# Patient Record
Sex: FEMALE | Race: WHITE | Employment: OTHER | URBAN - METROPOLITAN AREA
[De-identification: names, ages, dates, MRNs, and addresses within clinical notes are randomized per-mention and may not be internally consistent; named-entity substitution may affect disease eponyms.]

---

## 2023-09-13 ENCOUNTER — HOSPITAL ENCOUNTER (OUTPATIENT)
Dept: OCCUPATIONAL THERAPY | Age: 78
Setting detail: THERAPIES SERIES
Discharge: HOME | End: 2023-09-13
Payer: MEDICARE

## 2023-09-13 PROCEDURE — 97165 OT EVAL LOW COMPLEX 30 MIN: CPT

## 2023-09-13 PROCEDURE — 97537 COMMUNITY/WORK REINTEGRATION: CPT

## 2024-11-14 ENCOUNTER — HOSPITAL ENCOUNTER (INPATIENT)
Age: 79
LOS: 2 days | Discharge: HOME | End: 2024-11-16
Attending: EMERGENCY MEDICINE | Admitting: INTERNAL MEDICINE
Payer: MEDICARE

## 2024-11-14 ENCOUNTER — APPOINTMENT (OUTPATIENT)
Dept: CT IMAGING | Age: 79
End: 2024-11-14
Payer: MEDICARE

## 2024-11-14 ENCOUNTER — APPOINTMENT (OUTPATIENT)
Dept: GENERAL RADIOLOGY | Age: 79
End: 2024-11-14
Payer: MEDICARE

## 2024-11-14 DIAGNOSIS — R55 SYNCOPE AND COLLAPSE: Primary | ICD-10-CM

## 2024-11-14 DIAGNOSIS — R55 SYNCOPE, UNSPECIFIED SYNCOPE TYPE: ICD-10-CM

## 2024-11-14 LAB
ANION GAP SERPL CALCULATED.3IONS-SCNC: 16 MMOL/L (ref 3–16)
BACTERIA URNS QL MICRO: ABNORMAL /HPF
BASOPHILS # BLD: 0 K/UL (ref 0–0.2)
BASOPHILS NFR BLD: 0.6 %
BILIRUB UR QL STRIP.AUTO: NEGATIVE
BUN SERPL-MCNC: 25 MG/DL (ref 7–20)
CALCIUM SERPL-MCNC: 9.2 MG/DL (ref 8.3–10.6)
CHLORIDE SERPL-SCNC: 99 MMOL/L (ref 99–110)
CLARITY UR: CLEAR
CO2 SERPL-SCNC: 20 MMOL/L (ref 21–32)
COLOR UR: YELLOW
CREAT SERPL-MCNC: 1.1 MG/DL (ref 0.6–1.2)
DEPRECATED RDW RBC AUTO: 13.1 % (ref 12.4–15.4)
EOSINOPHIL # BLD: 0.1 K/UL (ref 0–0.6)
EOSINOPHIL NFR BLD: 1.5 %
EPI CELLS #/AREA URNS HPF: ABNORMAL /HPF (ref 0–5)
GFR SERPLBLD CREATININE-BSD FMLA CKD-EPI: 50 ML/MIN/{1.73_M2}
GLUCOSE SERPL-MCNC: 114 MG/DL (ref 70–99)
GLUCOSE UR STRIP.AUTO-MCNC: NEGATIVE MG/DL
HCT VFR BLD AUTO: 40 % (ref 36–48)
HGB BLD-MCNC: 13.4 G/DL (ref 12–16)
HGB UR QL STRIP.AUTO: NEGATIVE
KETONES UR STRIP.AUTO-MCNC: NEGATIVE MG/DL
LACTATE BLDV-SCNC: 3.4 MMOL/L (ref 0.4–2)
LEUKOCYTE ESTERASE UR QL STRIP.AUTO: NEGATIVE
LYMPHOCYTES # BLD: 3.2 K/UL (ref 1–5.1)
LYMPHOCYTES NFR BLD: 42.6 %
MAGNESIUM SERPL-MCNC: 1.71 MG/DL (ref 1.8–2.4)
MCH RBC QN AUTO: 30.4 PG (ref 26–34)
MCHC RBC AUTO-ENTMCNC: 33.4 G/DL (ref 31–36)
MCV RBC AUTO: 91.2 FL (ref 80–100)
MONOCYTES # BLD: 0.6 K/UL (ref 0–1.3)
MONOCYTES NFR BLD: 8 %
NEUTROPHILS # BLD: 3.5 K/UL (ref 1.7–7.7)
NEUTROPHILS NFR BLD: 47.3 %
NITRITE UR QL STRIP.AUTO: NEGATIVE
NT-PROBNP SERPL-MCNC: 138 PG/ML (ref 0–449)
PH BLDV: 7.39 [PH] (ref 7.35–7.45)
PH UR STRIP.AUTO: 6 [PH] (ref 5–8)
PLATELET # BLD AUTO: 204 K/UL (ref 135–450)
PMV BLD AUTO: 8.4 FL (ref 5–10.5)
POTASSIUM SERPL-SCNC: 3.5 MMOL/L (ref 3.5–5.1)
PROT UR STRIP.AUTO-MCNC: NEGATIVE MG/DL
RBC # BLD AUTO: 4.39 M/UL (ref 4–5.2)
RBC #/AREA URNS HPF: ABNORMAL /HPF (ref 0–4)
SODIUM SERPL-SCNC: 135 MMOL/L (ref 136–145)
SP GR UR STRIP.AUTO: 1.01 (ref 1–1.03)
TROPONIN, HIGH SENSITIVITY: 10 NG/L (ref 0–14)
TROPONIN, HIGH SENSITIVITY: 9 NG/L (ref 0–14)
UA DIPSTICK W REFLEX MICRO PNL UR: ABNORMAL
URN SPEC COLLECT METH UR: ABNORMAL
UROBILINOGEN UR STRIP-ACNC: 0.2 E.U./DL
WBC # BLD AUTO: 7.4 K/UL (ref 4–11)
WBC #/AREA URNS HPF: ABNORMAL /HPF (ref 0–5)

## 2024-11-14 PROCEDURE — 85025 COMPLETE CBC W/AUTO DIFF WBC: CPT

## 2024-11-14 PROCEDURE — 83880 ASSAY OF NATRIURETIC PEPTIDE: CPT

## 2024-11-14 PROCEDURE — 80048 BASIC METABOLIC PNL TOTAL CA: CPT

## 2024-11-14 PROCEDURE — 70450 CT HEAD/BRAIN W/O DYE: CPT

## 2024-11-14 PROCEDURE — P9612 CATHETERIZE FOR URINE SPEC: HCPCS

## 2024-11-14 PROCEDURE — 99285 EMERGENCY DEPT VISIT HI MDM: CPT

## 2024-11-14 PROCEDURE — 93005 ELECTROCARDIOGRAM TRACING: CPT | Performed by: EMERGENCY MEDICINE

## 2024-11-14 PROCEDURE — 84484 ASSAY OF TROPONIN QUANT: CPT

## 2024-11-14 PROCEDURE — 81001 URINALYSIS AUTO W/SCOPE: CPT

## 2024-11-14 PROCEDURE — 83735 ASSAY OF MAGNESIUM: CPT

## 2024-11-14 PROCEDURE — 36415 COLL VENOUS BLD VENIPUNCTURE: CPT

## 2024-11-14 PROCEDURE — 71045 X-RAY EXAM CHEST 1 VIEW: CPT

## 2024-11-14 PROCEDURE — 1200000000 HC SEMI PRIVATE

## 2024-11-14 PROCEDURE — 83605 ASSAY OF LACTIC ACID: CPT

## 2024-11-14 RX ORDER — ACETAMINOPHEN 650 MG/1
650 SUPPOSITORY RECTAL EVERY 6 HOURS PRN
Status: DISCONTINUED | OUTPATIENT
Start: 2024-11-14 | End: 2024-11-16 | Stop reason: HOSPADM

## 2024-11-14 RX ORDER — CHOLESTYRAMINE LIGHT 4 G/5.7G
4 POWDER, FOR SUSPENSION ORAL PRN
Status: ON HOLD | COMMUNITY
End: 2024-11-16 | Stop reason: HOSPADM

## 2024-11-14 RX ORDER — SODIUM CHLORIDE 9 MG/ML
INJECTION, SOLUTION INTRAVENOUS PRN
Status: DISCONTINUED | OUTPATIENT
Start: 2024-11-14 | End: 2024-11-16 | Stop reason: HOSPADM

## 2024-11-14 RX ORDER — POTASSIUM CHLORIDE 1500 MG/1
40 TABLET, EXTENDED RELEASE ORAL PRN
Status: DISCONTINUED | OUTPATIENT
Start: 2024-11-14 | End: 2024-11-16 | Stop reason: HOSPADM

## 2024-11-14 RX ORDER — SODIUM CHLORIDE 9 MG/ML
INJECTION, SOLUTION INTRAVENOUS ONCE
Status: COMPLETED | OUTPATIENT
Start: 2024-11-15 | End: 2024-11-15

## 2024-11-14 RX ORDER — MAGNESIUM SULFATE IN WATER 40 MG/ML
2000 INJECTION, SOLUTION INTRAVENOUS PRN
Status: DISCONTINUED | OUTPATIENT
Start: 2024-11-14 | End: 2024-11-16 | Stop reason: HOSPADM

## 2024-11-14 RX ORDER — APIXABAN 5 MG/1
5 TABLET, FILM COATED ORAL 2 TIMES DAILY
COMMUNITY
Start: 2024-09-13

## 2024-11-14 RX ORDER — ACETAMINOPHEN 325 MG/1
650 TABLET ORAL EVERY 6 HOURS PRN
Status: DISCONTINUED | OUTPATIENT
Start: 2024-11-14 | End: 2024-11-16 | Stop reason: HOSPADM

## 2024-11-14 RX ORDER — ONDANSETRON 2 MG/ML
4 INJECTION INTRAMUSCULAR; INTRAVENOUS EVERY 6 HOURS PRN
Status: DISCONTINUED | OUTPATIENT
Start: 2024-11-14 | End: 2024-11-16 | Stop reason: HOSPADM

## 2024-11-14 RX ORDER — POLYETHYLENE GLYCOL 3350 17 G/17G
17 POWDER, FOR SOLUTION ORAL DAILY PRN
Status: DISCONTINUED | OUTPATIENT
Start: 2024-11-14 | End: 2024-11-16 | Stop reason: HOSPADM

## 2024-11-14 RX ORDER — POTASSIUM CHLORIDE 7.45 MG/ML
10 INJECTION INTRAVENOUS PRN
Status: DISCONTINUED | OUTPATIENT
Start: 2024-11-14 | End: 2024-11-16 | Stop reason: HOSPADM

## 2024-11-14 RX ORDER — ONDANSETRON 4 MG/1
4 TABLET, ORALLY DISINTEGRATING ORAL EVERY 8 HOURS PRN
Status: DISCONTINUED | OUTPATIENT
Start: 2024-11-14 | End: 2024-11-16 | Stop reason: HOSPADM

## 2024-11-14 RX ORDER — AMLODIPINE BESYLATE 5 MG/1
1 TABLET ORAL DAILY
COMMUNITY
Start: 2024-11-12

## 2024-11-14 RX ORDER — SODIUM CHLORIDE 0.9 % (FLUSH) 0.9 %
5-40 SYRINGE (ML) INJECTION PRN
Status: DISCONTINUED | OUTPATIENT
Start: 2024-11-14 | End: 2024-11-16 | Stop reason: HOSPADM

## 2024-11-14 RX ORDER — SODIUM CHLORIDE 0.9 % (FLUSH) 0.9 %
5-40 SYRINGE (ML) INJECTION EVERY 12 HOURS SCHEDULED
Status: DISCONTINUED | OUTPATIENT
Start: 2024-11-14 | End: 2024-11-16 | Stop reason: HOSPADM

## 2024-11-14 RX ORDER — PAROXETINE 40 MG/1
40 TABLET, FILM COATED ORAL EVERY MORNING
Status: DISCONTINUED | OUTPATIENT
Start: 2024-11-15 | End: 2024-11-16 | Stop reason: HOSPADM

## 2024-11-14 ASSESSMENT — PAIN - FUNCTIONAL ASSESSMENT: PAIN_FUNCTIONAL_ASSESSMENT: NONE - DENIES PAIN

## 2024-11-14 ASSESSMENT — LIFESTYLE VARIABLES
HOW OFTEN DO YOU HAVE A DRINK CONTAINING ALCOHOL: 2-4 TIMES A MONTH
HOW MANY STANDARD DRINKS CONTAINING ALCOHOL DO YOU HAVE ON A TYPICAL DAY: 1 OR 2

## 2024-11-15 ENCOUNTER — APPOINTMENT (OUTPATIENT)
Age: 79
End: 2024-11-15
Attending: STUDENT IN AN ORGANIZED HEALTH CARE EDUCATION/TRAINING PROGRAM
Payer: MEDICARE

## 2024-11-15 ENCOUNTER — APPOINTMENT (OUTPATIENT)
Dept: MRI IMAGING | Age: 79
End: 2024-11-15
Payer: MEDICARE

## 2024-11-15 ENCOUNTER — APPOINTMENT (OUTPATIENT)
Dept: CT IMAGING | Age: 79
End: 2024-11-15
Payer: MEDICARE

## 2024-11-15 PROBLEM — I44.7 NEW ONSET LEFT BUNDLE BRANCH BLOCK (LBBB): Status: ACTIVE | Noted: 2024-11-15

## 2024-11-15 PROBLEM — R41.89 COGNITIVE DECLINE: Status: ACTIVE | Noted: 2024-11-15

## 2024-11-15 PROBLEM — G45.3 AMAUROSIS FUGAX OF LEFT EYE: Status: ACTIVE | Noted: 2024-11-15

## 2024-11-15 PROBLEM — G40.209 COMPLEX PARTIAL SEIZURES (HCC): Status: ACTIVE | Noted: 2024-11-14

## 2024-11-15 LAB
ANION GAP SERPL CALCULATED.3IONS-SCNC: 9 MMOL/L (ref 3–16)
BUN SERPL-MCNC: 20 MG/DL (ref 7–20)
CALCIUM SERPL-MCNC: 8.2 MG/DL (ref 8.3–10.6)
CHLORIDE SERPL-SCNC: 104 MMOL/L (ref 99–110)
CO2 SERPL-SCNC: 23 MMOL/L (ref 21–32)
CREAT SERPL-MCNC: 0.8 MG/DL (ref 0.6–1.2)
ECHO BSA: 2.13 M2
ECHO LA AREA 2C: 20.1 CM2
ECHO LA AREA 4C: 15.1 CM2
ECHO LA MAJOR AXIS: 4.8 CM
ECHO LA MINOR AXIS: 5.4 CM
ECHO LA VOL BP: 51 ML (ref 22–52)
ECHO LA VOL MOD A2C: 61 ML (ref 22–52)
ECHO LA VOL MOD A4C: 39 ML (ref 22–52)
ECHO LA VOL/BSA BIPLANE: 25 ML/M2 (ref 16–34)
ECHO LA VOLUME INDEX MOD A2C: 29 ML/M2 (ref 16–34)
ECHO LA VOLUME INDEX MOD A4C: 19 ML/M2 (ref 16–34)
ECHO LV E' LATERAL VELOCITY: 10.2 CM/S
ECHO LV E' SEPTAL VELOCITY: 4.57 CM/S
ECHO LV EDV A2C: 148 ML
ECHO LV EDV A4C: 134 ML
ECHO LV EDV INDEX A4C: 65 ML/M2
ECHO LV EDV NDEX A2C: 71 ML/M2
ECHO LV EJECTION FRACTION A2C: 55 %
ECHO LV EJECTION FRACTION A4C: 57 %
ECHO LV EJECTION FRACTION BIPLANE: 56 % (ref 55–100)
ECHO LV ESV A2C: 67 ML
ECHO LV ESV A4C: 58 ML
ECHO LV ESV INDEX A2C: 32 ML/M2
ECHO LV ESV INDEX A4C: 28 ML/M2
ECHO LV FRACTIONAL SHORTENING: 27 % (ref 28–44)
ECHO LV INTERNAL DIMENSION DIASTOLE INDEX: 2.32 CM/M2
ECHO LV INTERNAL DIMENSION DIASTOLIC: 4.8 CM (ref 3.9–5.3)
ECHO LV INTERNAL DIMENSION SYSTOLIC INDEX: 1.69 CM/M2
ECHO LV INTERNAL DIMENSION SYSTOLIC: 3.5 CM
ECHO LV IVSD: 1.2 CM (ref 0.6–0.9)
ECHO LV MASS 2D: 245.7 G (ref 67–162)
ECHO LV MASS INDEX 2D: 118.7 G/M2 (ref 43–95)
ECHO LV POSTERIOR WALL DIASTOLIC: 1.4 CM (ref 0.6–0.9)
ECHO LV RELATIVE WALL THICKNESS RATIO: 0.58
ECHO RV FREE WALL PEAK S': 14.3 CM/S
ECHO RV TAPSE: 3.2 CM (ref 1.7–?)
EKG ATRIAL RATE: 66 BPM
EKG DIAGNOSIS: NORMAL
EKG P AXIS: 75 DEGREES
EKG P-R INTERVAL: 166 MS
EKG Q-T INTERVAL: 492 MS
EKG QRS DURATION: 152 MS
EKG QTC CALCULATION (BAZETT): 515 MS
EKG R AXIS: 25 DEGREES
EKG T AXIS: 87 DEGREES
EKG VENTRICULAR RATE: 66 BPM
GFR SERPLBLD CREATININE-BSD FMLA CKD-EPI: 74 ML/MIN/{1.73_M2}
GLUCOSE SERPL-MCNC: 109 MG/DL (ref 70–99)
POTASSIUM SERPL-SCNC: 3.8 MMOL/L (ref 3.5–5.1)
SODIUM SERPL-SCNC: 136 MMOL/L (ref 136–145)

## 2024-11-15 PROCEDURE — 97535 SELF CARE MNGMENT TRAINING: CPT

## 2024-11-15 PROCEDURE — 6360000004 HC RX CONTRAST MEDICATION: Performed by: PSYCHIATRY & NEUROLOGY

## 2024-11-15 PROCEDURE — 95819 EEG AWAKE AND ASLEEP: CPT

## 2024-11-15 PROCEDURE — 97530 THERAPEUTIC ACTIVITIES: CPT

## 2024-11-15 PROCEDURE — 97165 OT EVAL LOW COMPLEX 30 MIN: CPT

## 2024-11-15 PROCEDURE — 93321 DOPPLER ECHO F-UP/LMTD STD: CPT | Performed by: INTERNAL MEDICINE

## 2024-11-15 PROCEDURE — 1200000000 HC SEMI PRIVATE

## 2024-11-15 PROCEDURE — 75574 CT ANGIO HRT W/3D IMAGE: CPT

## 2024-11-15 PROCEDURE — A9576 INJ PROHANCE MULTIPACK: HCPCS | Performed by: PSYCHIATRY & NEUROLOGY

## 2024-11-15 PROCEDURE — 6370000000 HC RX 637 (ALT 250 FOR IP): Performed by: PSYCHIATRY & NEUROLOGY

## 2024-11-15 PROCEDURE — 99223 1ST HOSP IP/OBS HIGH 75: CPT | Performed by: PSYCHIATRY & NEUROLOGY

## 2024-11-15 PROCEDURE — 6360000004 HC RX CONTRAST MEDICATION: Performed by: INTERNAL MEDICINE

## 2024-11-15 PROCEDURE — 36415 COLL VENOUS BLD VENIPUNCTURE: CPT

## 2024-11-15 PROCEDURE — 6370000000 HC RX 637 (ALT 250 FOR IP): Performed by: INTERNAL MEDICINE

## 2024-11-15 PROCEDURE — 70553 MRI BRAIN STEM W/O & W/DYE: CPT

## 2024-11-15 PROCEDURE — 97116 GAIT TRAINING THERAPY: CPT

## 2024-11-15 PROCEDURE — 93308 TTE F-UP OR LMTD: CPT | Performed by: INTERNAL MEDICINE

## 2024-11-15 PROCEDURE — 6370000000 HC RX 637 (ALT 250 FOR IP): Performed by: STUDENT IN AN ORGANIZED HEALTH CARE EDUCATION/TRAINING PROGRAM

## 2024-11-15 PROCEDURE — 93325 DOPPLER ECHO COLOR FLOW MAPG: CPT | Performed by: INTERNAL MEDICINE

## 2024-11-15 PROCEDURE — 80048 BASIC METABOLIC PNL TOTAL CA: CPT

## 2024-11-15 PROCEDURE — 2580000003 HC RX 258: Performed by: INTERNAL MEDICINE

## 2024-11-15 PROCEDURE — 93308 TTE F-UP OR LMTD: CPT

## 2024-11-15 PROCEDURE — 97162 PT EVAL MOD COMPLEX 30 MIN: CPT

## 2024-11-15 RX ORDER — ATORVASTATIN CALCIUM 10 MG/1
10 TABLET, FILM COATED ORAL NIGHTLY
Status: DISCONTINUED | OUTPATIENT
Start: 2024-11-15 | End: 2024-11-16

## 2024-11-15 RX ORDER — SODIUM CHLORIDE 9 MG/ML
INJECTION, SOLUTION INTRAVENOUS PRN
Status: DISCONTINUED | OUTPATIENT
Start: 2024-11-15 | End: 2024-11-16 | Stop reason: HOSPADM

## 2024-11-15 RX ORDER — METOPROLOL TARTRATE 100 MG/1
100 TABLET ORAL
Status: COMPLETED | OUTPATIENT
Start: 2024-11-15 | End: 2024-11-15

## 2024-11-15 RX ORDER — LOSARTAN POTASSIUM 50 MG/1
100 TABLET ORAL DAILY
Status: DISCONTINUED | OUTPATIENT
Start: 2024-11-15 | End: 2024-11-16 | Stop reason: HOSPADM

## 2024-11-15 RX ORDER — SODIUM CHLORIDE 0.9 % (FLUSH) 0.9 %
5-40 SYRINGE (ML) INJECTION PRN
Status: DISCONTINUED | OUTPATIENT
Start: 2024-11-15 | End: 2024-11-16 | Stop reason: HOSPADM

## 2024-11-15 RX ORDER — TRIAMTERENE AND HYDROCHLOROTHIAZIDE 37.5; 25 MG/1; MG/1
1 TABLET ORAL DAILY
Status: DISCONTINUED | OUTPATIENT
Start: 2024-11-15 | End: 2024-11-16 | Stop reason: HOSPADM

## 2024-11-15 RX ORDER — IOPAMIDOL 755 MG/ML
100 INJECTION, SOLUTION INTRAVASCULAR
Status: COMPLETED | OUTPATIENT
Start: 2024-11-15 | End: 2024-11-15

## 2024-11-15 RX ORDER — LEVETIRACETAM 500 MG/1
500 TABLET ORAL 2 TIMES DAILY
Status: DISCONTINUED | OUTPATIENT
Start: 2024-11-15 | End: 2024-11-16 | Stop reason: HOSPADM

## 2024-11-15 RX ORDER — METOPROLOL TARTRATE 50 MG
50 TABLET ORAL
Status: COMPLETED | OUTPATIENT
Start: 2024-11-15 | End: 2024-11-15

## 2024-11-15 RX ORDER — SODIUM CHLORIDE 0.9 % (FLUSH) 0.9 %
5-40 SYRINGE (ML) INJECTION EVERY 12 HOURS SCHEDULED
Status: DISCONTINUED | OUTPATIENT
Start: 2024-11-15 | End: 2024-11-16 | Stop reason: HOSPADM

## 2024-11-15 RX ORDER — METOPROLOL TARTRATE 1 MG/ML
5 INJECTION, SOLUTION INTRAVENOUS EVERY 5 MIN PRN
Status: DISCONTINUED | OUTPATIENT
Start: 2024-11-15 | End: 2024-11-16 | Stop reason: HOSPADM

## 2024-11-15 RX ORDER — NITROGLYCERIN 0.4 MG/1
0.8 TABLET SUBLINGUAL
Status: COMPLETED | OUTPATIENT
Start: 2024-11-15 | End: 2024-11-15

## 2024-11-15 RX ORDER — NITROGLYCERIN 0.4 MG/1
0.4 TABLET SUBLINGUAL
Status: COMPLETED | OUTPATIENT
Start: 2024-11-15 | End: 2024-11-15

## 2024-11-15 RX ORDER — LORAZEPAM 0.5 MG/1
0.5 TABLET ORAL ONCE
Status: COMPLETED | OUTPATIENT
Start: 2024-11-15 | End: 2024-11-15

## 2024-11-15 RX ADMIN — Medication 3 MG: at 01:37

## 2024-11-15 RX ADMIN — APIXABAN 5 MG: 5 TABLET, FILM COATED ORAL at 20:16

## 2024-11-15 RX ADMIN — SODIUM CHLORIDE: 9 INJECTION, SOLUTION INTRAVENOUS at 01:47

## 2024-11-15 RX ADMIN — ATORVASTATIN CALCIUM 10 MG: 10 TABLET, FILM COATED ORAL at 12:02

## 2024-11-15 RX ADMIN — SODIUM CHLORIDE, PRESERVATIVE FREE 10 ML: 5 INJECTION INTRAVENOUS at 01:38

## 2024-11-15 RX ADMIN — LEVETIRACETAM 500 MG: 500 TABLET, FILM COATED ORAL at 13:20

## 2024-11-15 RX ADMIN — BUPROPION HYDROCHLORIDE 40 MG: 150 TABLET, FILM COATED, EXTENDED RELEASE ORAL at 08:41

## 2024-11-15 RX ADMIN — SODIUM CHLORIDE, PRESERVATIVE FREE 10 ML: 5 INJECTION INTRAVENOUS at 08:41

## 2024-11-15 RX ADMIN — NITROGLYCERIN 0.8 MG: 0.4 TABLET SUBLINGUAL at 13:47

## 2024-11-15 RX ADMIN — SODIUM CHLORIDE, PRESERVATIVE FREE 10 ML: 5 INJECTION INTRAVENOUS at 20:18

## 2024-11-15 RX ADMIN — LEVETIRACETAM 500 MG: 500 TABLET, FILM COATED ORAL at 20:17

## 2024-11-15 RX ADMIN — IOPAMIDOL 100 ML: 755 INJECTION, SOLUTION INTRAVENOUS at 13:47

## 2024-11-15 RX ADMIN — LORAZEPAM 0.5 MG: 0.5 TABLET ORAL at 16:10

## 2024-11-15 RX ADMIN — GADOTERIDOL 20 ML: 279.3 INJECTION, SOLUTION INTRAVENOUS at 16:55

## 2024-11-15 RX ADMIN — APIXABAN 5 MG: 5 TABLET, FILM COATED ORAL at 08:41

## 2024-11-15 RX ADMIN — LOSARTAN POTASSIUM 100 MG: 50 TABLET, FILM COATED ORAL at 12:01

## 2024-11-15 RX ADMIN — Medication 3 MG: at 20:17

## 2024-11-15 RX ADMIN — TRIAMTERENE AND HYDROCHLOROTHIAZIDE 1 TABLET: 37.5; 25 TABLET ORAL at 12:02

## 2024-11-15 RX ADMIN — APIXABAN 5 MG: 5 TABLET, FILM COATED ORAL at 01:37

## 2024-11-15 RX ADMIN — METOPROLOL TARTRATE 50 MG: 50 TABLET, FILM COATED ORAL at 11:31

## 2024-11-15 NOTE — ED NOTES
Patient ambulated to and from bathroom with steady gait   Urine sent to lab from prior RN.   Patient on continuous cardiac and SpO2 monitoring, equal rise in fall in chest, no signs of distress noted. No requests from patient at the moment. Bed in lowest position and call light within reach.        Jhonatan Rangel, RN  11/14/24 [id number]

## 2024-11-15 NOTE — PROGRESS NOTES
Patient declined admission questions due to request to sleep and answer during normal hours when awake in the daytime.

## 2024-11-15 NOTE — PLAN OF CARE
Problem: Discharge Planning  Goal: Discharge to home or other facility with appropriate resources  Outcome: Progressing  Flowsheets (Taken 11/15/2024 1519)  Discharge to home or other facility with appropriate resources:   Identify barriers to discharge with patient and caregiver   Arrange for needed discharge resources and transportation as appropriate   Identify discharge learning needs (meds, wound care, etc)     Problem: Safety - Adult  Goal: Free from fall injury  11/15/2024 1519 by Angi Perla RN  Outcome: Progressing  Flowsheets (Taken 11/15/2024 1519)  Free From Fall Injury: Instruct family/caregiver on patient safety

## 2024-11-15 NOTE — ED PROVIDER NOTES
THE [facility]  EMERGENCY DEPARTMENT ENCOUNTER          ATTENDING PHYSICIAN NOTE       Date of evaluation: 11/14/2024    ADDENDUM:      Care of this patient was assumed from Dr Sommer.  The patient was seen for Loss of Consciousness (Pt presents to the ED after a syncopal episode at home. Pt states that she does not remember what she was doing before she woke up on the floor. Pt stated that she thinks she may have fell. Pt is denying having any pain. Pt is alert and oriented x3. (disoriented to time) )  .  The patient's initial evaluation and plan have been discussed with the prior provider who initially evaluated the patient.  Nursing Notes, Past Medical Hx, Past Surgical Hx, Social Hx, Allergies, and Family Hx were all reviewed.    ASSESSMENT / PLAN  (MEDICAL DECISION MAKING)     Betty J Closser is a 79 y.o. female who presents with a chief complaint of loss of consciousness.  Patient reportedly had loss of consciousness at home, unclear if syncopal episode.  Seems confused, does not remember the event.  I was asked to follow-up on labs and then ultimately admit the patient for syncope workup.  Labs unremarkable with negative troponins, slightly elevated lactate, slightly low magnesium.  Patient well-appearing on several reassessments.  Urinalysis negative for infection.  Updated on plan of care to admit for syncope workup.  Stable for floor.    Is this patient to be included in the SEP-1 core measure? No Exclusion criteria - the patient is NOT to be included for SEP-1 Core Measure due to: Infection is not suspected    Medical Decision Making  Problems Addressed:  Syncope and collapse: acute illness or injury    Amount and/or Complexity of Data Reviewed  Labs: ordered. Decision-making details documented in ED Course.  Radiology: ordered. Decision-making details documented in ED Course.  ECG/medicine tests: ordered and independent interpretation performed. Decision-making details documented in ED Course.         Chloride 99 99 - 110 mmol/L    CO2 20 (L) 21 - 32 mmol/L    Anion Gap 16 3 - 16    Glucose 114 (H) 70 - 99 mg/dL    BUN 25 (H) 7 - 20 mg/dL    Creatinine 1.1 0.6 - 1.2 mg/dL    Est, Glom Filt Rate 50 (A) >60    Calcium 9.2 8.3 - 10.6 mg/dL   Brain Natriuretic Peptide   Result Value Ref Range    NT Pro- 0 - 449 pg/mL   Blood gas, venous (Lab)   Result Value Ref Range    pH, Paul 7.391 7.350 - 7.450   Lactic Acid   Result Value Ref Range    Lactic Acid 3.4 (H) 0.4 - 2.0 mmol/L   Urinalysis with Microscopic   Result Value Ref Range    Color, UA Yellow Straw/Yellow    Clarity, UA Clear Clear    Glucose, Ur Negative Negative mg/dL    Bilirubin, Urine Negative Negative    Ketones, Urine Negative Negative mg/dL    Specific Gravity, UA 1.015 1.005 - 1.030    Blood, Urine Negative Negative    pH, Urine 6.0 5.0 - 8.0    Protein, UA Negative Negative mg/dL    Urobilinogen, Urine 0.2 <2.0 E.U./dL    Nitrite, Urine Negative Negative    Leukocyte Esterase, Urine Negative Negative    Microscopic Examination Not Indicated     Urine Type NotGiven     WBC, UA 0-2 0 - 5 /HPF    RBC, UA 0-2 0 - 4 /HPF    Epithelial Cells, UA 0-1 0 - 5 /HPF    Bacteria, UA Rare (A) None Seen /HPF   Troponin   Result Value Ref Range    Troponin, High Sensitivity 9 0 - 14 ng/L   Troponin   Result Value Ref Range    Troponin, High Sensitivity 10 0 - 14 ng/L   Magnesium   Result Value Ref Range    Magnesium 1.71 (L) 1.80 - 2.40 mg/dL     EKG   See prior provider note for details    MOST RECENT VITALS:  BP: (!) 107/57, Temp: 98 °F (36.7 °C), Pulse: 63, Respirations: 17, SpO2: 97 %     Procedures     none    ED Course          The patient was given the following medications:  No orders of the defined types were placed in this encounter.      CONSULTS:  None       Marci Sagastume MD  11/14/24 0688

## 2024-11-15 NOTE — CONSULTS
N/V/D. No abdominal pain, appetite loss, blood in stools.  GENITOURINARY: No dysuria, trouble voiding, or hematuria.  MUSCULOSKELETAL:  No gait disturbance, weakness or joint complaints.  NEUROLOGICAL: No headache, diplopia, change in muscle strength, numbness or tingling. No change in gait, balance, coordination, mood, affect, memory, mentation, behavior.  PSHYCH: No anxiety, loss of interest, change in sexual behavior, feelings of self-harm, or confusion.  ENDOCRINE: No malaise, fatigue or temperature intolerance. No excessive thirst, fluid intake, or urination. No tremor.  HEMATOLOGIC: No abnormal bruising or bleeding.  ALLERGY: No nasal congestion or hives.      Physical Examination:     Vitals:    11/15/24 0300 11/15/24 0504 11/15/24 0544 11/15/24 0737   BP:   (!) 160/69 (!) 172/79   Pulse:  59 61 71   Resp:   16 16   Temp:   98.1 °F (36.7 °C) 97.4 °F (36.3 °C)   TempSrc:   Oral Oral   SpO2:   97% 93%   Weight: 96.5 kg (212 lb 11.9 oz)      Height:           Wt Readings from Last 3 Encounters:   11/15/24 96.5 kg (212 lb 11.9 oz)       Objective:  General Appearance:  Comfortable.    Vital signs: (most recent): Blood pressure (!) 172/79, pulse 71, temperature 97.4 °F (36.3 °C), temperature source Oral, resp. rate 16, height 1.702 m (5' 7\"), weight 96.5 kg (212 lb 11.9 oz), SpO2 93%, not currently breastfeeding.    Lungs:  Normal effort and normal respiratory rate.    Heart: Normal rate.  Regular rhythm.          General Appearance:  Alert, cooperative, no distress, appears stated age Appropriate weight   Head:  Normocephalic, without obvious abnormality, atraumatic   Eyes:  PERRL, conjunctiva/corneas clear EOM intact  Ears normal   Throat no lesions       Nose: Nares normal, no drainage or sinus tenderness   Throat: Lips, mucosa, and tongue normal   Neck: Supple, symmetrical, trachea midline, no adenopathy, thyroid: not enlarged, symmetric, no tenderness/mass/nodules, no carotid bruit or JVD       Lungs:   Clear  likely secondary to a syncopal event. Patient's last ambulatory monitor on 7/20/204 was unremarkable. Patient noted to have non obstructive CAD in last coronary CT in 2023; given presence of chest pain will repeat coronary CT. Given presence of new LBBB on EKG, will evaluate with CMR outpatient and 1 month monitor. Consider vascular consult given presence of carotid atherosclerosis and hx of amaurosis fugax  -f/u coronary CT  -CMR outpatient  -cardiac monitor for one month on discharge.    Thank you for allowing to us to participate in the care or Betty J Closser. Further evaluation will be based upon the patient's clinical course and testing results.    I have spent 40 minutes of face to face time with the patient with more than 50% spent counseling and coordinating care.       Boone Zaidi MD, PGY-1  11/15/24  10:03 AM

## 2024-11-15 NOTE — ED NOTES
Report given to receiving RN, no questions or concerns noted      Jhonatan Rangel, RN  11/14/24 0432

## 2024-11-15 NOTE — PROGRESS NOTES
Orthostatic BP check as follows:    0047 (lying):  /77, P 61  0051 (sitting):  /73, P 63  0056 (standing):  /79, P 64

## 2024-11-15 NOTE — PLAN OF CARE
Cardiac navigator informed me she could not delivered 2-week CAM as patient needed to have MRI done first.    Was later informed by nursing staff that monitor was left with nurse to place on patient at discharge.

## 2024-11-15 NOTE — PLAN OF CARE
[facility] [place]  Consult received.  Case reviewed with ER physician- admit for syncope    Full note to follow.    Lydia Byers, CAROL - CNP    Thanks  DAVID MARQUIS MD

## 2024-11-15 NOTE — ED NOTES
How does patient ambulate?   []Low Fall Risk (ambulates by themselves without support)  [x]Stand by assist   []Contact Guard   []Front wheel walker  []Wheelchair   [x]Steady  []Bed bound  []History of Lower Extremity Amputation  []Unknown, did not assess in the emergency department   How does patient take pills?  [x]Whole with Water  []Crushed in applesauce  []Crushed in pudding  []Other  []Unknown no oral medications were given in the ED  Is patient alert?   [x]Alert  []Drowsy but responds to voice  []Doesn't respond to voice but responds to painful stimuli  []Unresponsive  Is patient oriented?   [x]To person  [x]To place  [x]To time  []To situation  []Confused  []Agitated  []Follows commands  If patient is disoriented or from a Skill Nursing Facility has family been notified of admission?   []Yes   []No  Patient belongings?   []Cell phone  []Wallet   []Dentures  []Clothing  Any specific patient or family belongings/needs/dynamics?   Family at bedside  Miscellaneous comments/pending orders?  All ED orders complete    If there are any additional questions please reach out to the Emergency Department.           Rangel, Jhonatan, RN  11/14/24 0543

## 2024-11-15 NOTE — DISCHARGE INSTRUCTIONS
Patient instructions for CAM monitor:     You will need to wear monitor for 1  week.   Mail monitor back or return to office on following date.    Remove date:  11/22/2024      [facility]  [address]   [address]  [place], [place] [address]  [phone]         Make sure to save box as you will place monitor back in box to return to office.       After removing monitor stick it to template provided, place both your log and monitor in box          If monitor comes off but has been in place at least  5 days place in box and return to office..  If it comes off sooner than 5 days you will have to call office and return to have it replaced.        Avoid excess sweating to maximize wear time.         You are able to shower after 24 hours, however have majority of water hitting back and not directly on monitor. Do not submerge in bath.           If you experience any symptoms while wearing monitor push button and record in booklet.      For questions about monitor call: Customer Service [phone]         We are discharging you with some changes to your medications as well as some new prescriptions. We are starting you on a baby aspirin, 81mg once per day. A prescription for this has been sent to your pharmacy though it is also available over the counter. We are also discharging you with a new seizure medication called levetiracetam (Keppra), which will be 500mg twice per day. We have also increased your atorvastatin (Lipitor) to 40mg once per night, which is increased from the 10mg you were previously on.     Please resume the rest of your home medications as you have been prescribed them.    Please follow up with neurology as an outpatient.    Please follow up with your cardiologist for further management and results regarding you heart monitor.     Please follow up with your primary care physician within 1 week follow discharge    Please return to the hospital with any new or worsening symptoms.

## 2024-11-15 NOTE — PROGRESS NOTES
4 Eyes Admission Assessment     I agree as the admission nurse that 2 RN's have performed a thorough Head to Toe Skin Assessment on the patient. ALL assessment sites listed below have been assessed on admission.       Areas assessed by both nurses: Juana Le and Kun Min, RNs  [x]   Head, Face, and Ears   [x]   Shoulders, Back, and Chest  [x]   Arms, Elbows, and Hands   [x]   Coccyx, Sacrum, and Ischium  [x]   Legs, Feet, and Heels        Does the Patient have Skin Breakdown?  No         Zana Prevention initiated:  Yes   Wound Care Orders initiated:  NA      [facility] nurse consulted for Pressure Injury (Stage 3,4, Unstageable, DTI, NWPT, and Complex wounds) or Zana score 18 or lower:  NA      Nurse 1 eSignature: Electronically signed by Juana Le RN on 11/15/24 at 3:09 AM EST    **SHARE this note so that the co-signing nurse is able to place an eSignature**    Nurse 2 eSignature: Electronically signed by Angi Perla RN on 11/15/24 at 7:11 PM EST

## 2024-11-15 NOTE — PROGRESS NOTES
Occupational Therapy  Facility/Department: [address]  Occupational Therapy Initial Assessment and Treatment  Discharge    Name: Betty J Closser  : 1945  MRN: [id number]  Date of Service: 11/15/2024    Discharge Recommendations:  Home with assist PRN  OT Equipment Recommendations  Equipment Needed: No       Patient Diagnosis(es): The primary encounter diagnosis was Syncope and collapse. A diagnosis of Syncope, unspecified syncope type was also pertinent to this visit.           Assessment  Assessment: Presenting s/p syncopal episode - being worked up for possible seizures. Pt lives alone and typically independent w/ ADLs, IADLs, and ambulates w/o AD (does have housekeeping assist 1x/month and dtr checks on daily). Today, pt demonstrating abiliity to engage in ADLs and functional transfers/mobility safely w/ no LOB. Pt has no skilled OT needs. Did discuss recommendation for Alert System w/ fall detection feature to sound off emergency alert should pt have an episode - pt/family receptive to suggestion. Will sign off.  Decision Making: Low Complexity  REQUIRES OT FOLLOW-UP: No  Activity Tolerance  Activity Tolerance: Patient Tolerated treatment well     Plan  Occupational Therapy Plan  Times Per Week: Discharge acute OT - no further OT needs indicated.    Restrictions  Position Activity Restriction  Other position/activity restrictions: Up as tolerated    Subjective  General  Chart Reviewed: Yes  Additional Pertinent Hx: 79 y.o. F presents to the ED after a syncopal episode at home. Pt states that she does not remember what she was doing before she woke up on the floor.              Hospital Course: CXR: (-); CT Head: most likely represent chronic small vessel ischemic change.                  PMH: mild cognitive impairment, PAF, HTN, HLD, OA, HOLLEY on CPAP  Family / Caregiver Present: Yes (dtr and son-in-law)  Referring Practitioner: Mukund Braun MD  Diagnosis: Syncope and

## 2024-11-15 NOTE — CONSULTS
Neurology Consultation Note      Patient: Betty J Closser MRN: [id number]    YOB: 1945  Age: 79 y.o.  Sex: female   Unit: [address] Room/Bed: 6301/6301-01 Location: [facility]    Date of Consultation: 11/15/2024  Date of Admission: 11/14/2024  8:29 PM ( LOS: 1 day )  Admitting Physician: DAVID MARQUIS    Primary Care Physician: Lydia Byers, CAROL - CNP   Consult Requested By: David Marquis MD     Reason for Consult: \"syncope\"    ASSESSMENT & RECOMMENDATIONS     Assessment  78yo woman with mild cognitive impairment and afib on apixaban with apparent episode of amaurosis fugax in March of this year (~8 mo ago) presented to ED after an episode of transient alteration of awareness to which she is amnestic for several hours surrounding the event  It seems that her amnesia to the event is out of proportion to her degree of cognitive decline and thusly cannot be attributed to that  Amnesia to this degree is more suspicious for complex partial seizure  When the event which occurred in June, which was very similar to last night's event, is factored in, the likelihood of seizure becomes even greater  She is being evaluated for any potential cardiac causes of her event, but the overall clinical picture is not consistent with syncope and therefore unlikely there is any cardiac cause here at all  Her episode of apparent amaurosis is concerning, but unlikely relevant here  Workup for this included carotid ultrasound, which was unremarkable, but treatment is usually antiplatelet agent (which she is not on) and tight control of vascular risk factors (all of which is recommended)  Her apparent mild cognitive impairment (MCI), which was diagnosed a few years ago and may have progressed at this point, does not likely directly factor into her likely seizure diagnosis, although it could because there is an increased risk of seizures in dementia  Given the likelihood that these events  Sensitivity 9 0 - 14 ng/L   Magnesium    Collection Time: 11/14/24  9:26 PM   Result Value Ref Range    Magnesium 1.71 (L) 1.80 - 2.40 mg/dL   Blood gas, venous (Lab)    Collection Time: 11/14/24  9:36 PM   Result Value Ref Range    pH, Paul 7.391 7.350 - 7.450   Urinalysis with Microscopic    Collection Time: 11/14/24 10:15 PM   Result Value Ref Range    Color, UA Yellow Straw/Yellow    Clarity, UA Clear Clear    Glucose, Ur Negative Negative mg/dL    Bilirubin, Urine Negative Negative    Ketones, Urine Negative Negative mg/dL    Specific Gravity, UA 1.015 1.005 - 1.030    Blood, Urine Negative Negative    pH, Urine 6.0 5.0 - 8.0    Protein, UA Negative Negative mg/dL    Urobilinogen, Urine 0.2 <2.0 E.U./dL    Nitrite, Urine Negative Negative    Leukocyte Esterase, Urine Negative Negative    Microscopic Examination Not Indicated     Urine Type NotGiven     WBC, UA 0-2 0 - 5 /HPF    RBC, UA 0-2 0 - 4 /HPF    Epithelial Cells, UA 0-1 0 - 5 /HPF    Bacteria, UA Rare (A) None Seen /HPF   Troponin    Collection Time: 11/14/24 10:27 PM   Result Value Ref Range    Troponin, High Sensitivity 10 0 - 14 ng/L   Basic Metabolic Panel w/ Reflex to MG    Collection Time: 11/15/24  2:45 AM   Result Value Ref Range    Sodium 136 136 - 145 mmol/L    Potassium reflex Magnesium 3.8 3.5 - 5.1 mmol/L    Chloride 104 99 - 110 mmol/L    CO2 23 21 - 32 mmol/L    Anion Gap 9 3 - 16    Glucose 109 (H) 70 - 99 mg/dL    BUN 20 7 - 20 mg/dL    Creatinine 0.8 0.6 - 1.2 mg/dL    Est, Glom Filt Rate 74 >60    Calcium 8.2 (L) 8.3 - 10.6 mg/dL       Scheduled Meds:   sodium chloride flush  5-40 mL IntraVENous 2 times per day    apixaban  5 mg Oral BID    PARoxetine  40 mg Oral QAM    sodium chloride flush  5-40 mL IntraVENous 2 times per day    melatonin  3 mg Oral Nightly       Continuous Infusions:  sodium chloride  sodium chloride         PRN Meds:  perflutren lipid microspheres, 1.5 mL, ONCE PRN  metoprolol tartrate, 50 mg, Once PRN

## 2024-11-15 NOTE — ED PROVIDER NOTES
THE [facility]  EMERGENCY DEPARTMENT ENCOUNTER          ATTENDING PHYSICIAN NOTE       Date of evaluation: 11/14/2024    Chief Complaint     Loss of Consciousness (Pt presents to the ED after a syncopal episode at home. Pt states that she does not remember what she was doing before she woke up on the floor. Pt stated that she thinks she may have fell. Pt is denying having any pain. Pt is alert and oriented x3. (disoriented to time) )      History of Present Illness     Betty J Closser is a 79 y.o. female with a past medical history of mild cognitive impairment, paroxysmal atrial fibrillation for which she is anticoagulated on Eliquis, hypertension, and a number of other medical comorbidities.  She was apparently evaluated earlier this year for an episode of amaurosis fugax.  She lives independently in an apartment, and is generally functional at baseline.  She presents to the emergency department by EMS after a possible syncopal episode, with confusion and amnesia.  EMS reports that the patient potentially fell, although cannot say when.  On my initial interview with the patient, she describes being on the couch and then waking up on the floor not knowing how she got there.  Later in the interview, she does not recall saying this at all, and does not recall ever having been on the floor.  The patient is unable to give any further details of the events of this evening.  The patient's sister arrived shortly thereafter, and states that around dinnertime this evening, the patient called her and stated that she did not feel well and wanted her to come over and help, although had difficulty describing exactly what she needed help with.  At that time she did not describe any falls.  In the meantime, other family members were called, and at some point 911 was called by a family member.  When another family member arrived, EMS had also just arrived, and the patient was seated in a chair being evaluated.  EMS stated  feel poorly.  It is not clear whether she had a fall or a syncopal event, or whether she was simply very weak and needed to crawl to the door to let EMS in.  EMS was not able to give any history, and the patient herself does not recall any of the events of this evening.  Her sister describes that she called stating that she did not feel well and needed help, but was not able to describe any particular way in which she felt poorly or any particular help that she needed.  There has been some question of whether she may have fallen or passed out, but she has no evidence of any traumatic injury on exam.  She has a history earlier this year of having an episode of amaurosis fugax, but currently does not have any focal neurologic deficits to suggest a strokelike event.    A fairly broad workup has been initiated.  Head CT shows some age-indeterminate lacunar changes, but no acute abnormalities otherwise.  EKG shows a left bundle branch block, without other acute ischemic changes.  There are no prior EKG images in our system to compare to, as her prior care appears to been in the [facility] system.  However, I do not see any documentation of a left bundle branch block, and the most recent EKG performed in April of this year showed a normal QRS duration at 107.    At this time, the remainder of the patient's workup is pending, including troponin.  She is not having any active chest pain, and is not certain when a new left bundle branch block may have developed, but given this EKG finding, as well as the questionable syncopal event today, it is likely that she will warrant admission for further evaluation and management.    The patient's care is being given over the oncoming physician, who will follow-up on the results of the remainder of the patient's workup in the emergency department, and determine appropriate further management and disposition accordingly.      (Please note that portions of this note were completed

## 2024-11-15 NOTE — PROGRESS NOTES
D:  Please note, patient is newly admitted to [address] [place] [address] from the ED.  There are no diet orders on file and patient is asking for water.  Please advise.  Thank you.  A:  Notified provider, Shira Keith.  R:  Per provider above, \"Order is in for a Regular Diet.\"

## 2024-11-15 NOTE — PROGRESS NOTES
Admission: Patient received to room 6301 from ED. Patient admitted with Dx of Syncope and Collapse. Patient A&Ox4 upon arrival.  Tele monitor applied, rate and rhythm verified with monitor reader. Admission assessment as charted. VSS. Patient oriented to room, staff, and call system. Educated on fall protocol and hourly rounding. Patient informed to utilize call light with any needs. Pt verbalized understanding. Will continue to monitor.

## 2024-11-15 NOTE — H&P
V2.0  History and Physical      Name:  Betty J Closser /Age/Sex: 1945  (79 y.o. female)   MRN & CSN:  [id number] & [id number] Encounter Date/Time: 2024 11:39 PM EST   Location:   PCP: Lydia Byers APRN - CNP       Hospital Day: 1    Assessment and Plan:   Betty J Closser is a 79 y.o. female with PMH of PAF, HTN, HLD, OA, HOLLEY on CPAP who presents with Syncope and collapse    Syncope- patient passed out and woke up on floor    BP soft and labs shows mild hyponatremia and mild hypomagnesemia- she is on dyazide at home  ? Dehydration  EKG shows LBBB but nothing to compare with- h/o NICM which has since resolved per notes from [place]  Consult cardiology  Consult neurology  Telemetry  Check orthostatics  Hold BP meds for now  CTH/CXR NAD    2. Essential HTN- chronic    Hold home meds for now  /70 in ER    3. PAF- chronic    EKG shows nSR  Resume eliquis    4. Major depression- chronic    Resume paxil    5. HOLLEY- resume home CPAP  Hospital Problems             Last Modified POA    * (Principal) Syncope and collapse 2024 Yes       Disposition:   Current Living situation: home  Expected Disposition: home  Estimated D/C: 11/15/24    Diet No diet orders on file   DVT Prophylaxis [] Lovenox, []  Heparin, [] SCDs, [] Ambulation,  [x] Eliquis, [] Xarelto, [] Coumadin   Code Status Full Code   Surrogate Decision Maker/ POA      Personally reviewed Lab Studies and Imaging       History from:     patient    History of Present Illness:     Chief Complaint: passed out    Betty J Closser is a 79 y.o. female with a past medical history of mild cognitive impairment, paroxysmal atrial fibrillation for which she is anticoagulated on Eliquis, hypertension, HOLLEY, HLD.  She was apparently evaluated earlier this year for an episode of amaurosis fugax.  She lives independently in an apartment, and is generally functional at baseline.  She presents to the emergency department by EMS after a possible  Negative.    Psychiatric/Behavioral: Negative.       Pertinent positives and negatives discussed in HPI     Objective:   No intake or output data in the 24 hours ending 11/14/24 2339   Vitals:   Vitals:    11/14/24 2158 11/14/24 2208 11/14/24 2230 11/14/24 2300   BP: 127/75  133/66 (!) 107/57   Pulse: 62 67 65 63   Resp: 17 19 17 17   Temp:       TempSrc:       SpO2:  98% 100% 97%   Weight:       Height:           Medications Prior to Admission     Prior to Admission medications    Medication Sig Start Date End Date Taking? Authorizing Provider   ELIQUIS 5 MG TABS tablet Take 1 tablet by mouth 2 times daily 9/13/24  Yes Jaimie Galvez MD   amLODIPine (NORVASC) 5 MG tablet Take 1 tablet by mouth daily 11/12/24  Yes Jaimie Galvez MD   CALCIUM PO Take by mouth    Jaimie Galvez MD   cholestyramine light 4 g packet Take 1 packet by mouth as needed    Jaimie Galvez MD   atorvastatin (LIPITOR) 10 MG tablet Take 10 mg by mouth daily.      Jaimie Galvez MD   paroxetine (PAXIL) 20 MG tablet Take 2 tablets by mouth every morning    Jaimie Galvez MD   triamterene-hydrochlorothiazide (MAXZIDE-25) 37.5-25 MG per tablet Take 1 tablet by mouth daily.      Jaimie Galvez MD   sumatriptan (IMITREX) 50 MG tablet Take 50 mg by mouth once as needed.      Jaimie Galvez MD   losartan (COZAAR) 100 MG tablet Take 1 tablet by mouth daily    Jaimie Galvez MD       Physical Exam:    Physical Exam     General: NAD  Eyes: EOMI  ENT: neck supple  Cardiovascular: Regular rate.  Respiratory: Clear to auscultation  Gastrointestinal: Soft, non tender  Genitourinary: no suprapubic tenderness  Musculoskeletal: No edema  Skin: warm, dry  Neuro: Alert.  Psych: Mood appropriate.       Past Medical History:   PMHx   Past Medical History:   Diagnosis Date    Cancer (HCC) breast    Hyperlipidemia     Hypertension      PSHX:  has a past surgical history that includes Breast

## 2024-11-15 NOTE — PROGRESS NOTES
understanding      Therapy Time   Individual Concurrent Group Co-treatment   Time In 1111         Time Out 1150         Minutes 39         Timed Code Treatment Minutes:  25  Total Treatment Minutes:  39      Carolyn Waterman, PT

## 2024-11-15 NOTE — PLAN OF CARE
Problem: Safety - Adult  Goal: Free from fall injury  Outcome: Progressing  Note:  Remains free from falls, bed in low position, call light in reach, bed alarm monitoring for safety.     Problem: Pain  Goal: Verbalizes/displays adequate comfort level or baseline comfort level  Outcome: Progressing  Note:  Denies pain/needs, will continue to monitor.     Problem: Respiratory - Adult  Goal: Achieves optimal ventilation and oxygenation  Outcome: Progressing  Note:  O2 97% on Room Air.     Problem: Cardiovascular - Adult  Goal: Absence of cardiac dysrhythmias or at baseline  Outcome: Progressing  Note:  Sinus Bradycardia with PQT & IVCD rate 58 at 0202.

## 2024-11-16 VITALS
TEMPERATURE: 97.4 F | HEART RATE: 60 BPM | HEIGHT: 67 IN | BODY MASS INDEX: 33.36 KG/M2 | OXYGEN SATURATION: 98 % | DIASTOLIC BLOOD PRESSURE: 77 MMHG | SYSTOLIC BLOOD PRESSURE: 124 MMHG | WEIGHT: 212.52 LBS | RESPIRATION RATE: 16 BRPM

## 2024-11-16 LAB
ANION GAP SERPL CALCULATED.3IONS-SCNC: 7 MMOL/L (ref 3–16)
BUN SERPL-MCNC: 13 MG/DL (ref 7–20)
CALCIUM SERPL-MCNC: 8.5 MG/DL (ref 8.3–10.6)
CHLORIDE SERPL-SCNC: 103 MMOL/L (ref 99–110)
CO2 SERPL-SCNC: 25 MMOL/L (ref 21–32)
CREAT SERPL-MCNC: 0.8 MG/DL (ref 0.6–1.2)
GFR SERPLBLD CREATININE-BSD FMLA CKD-EPI: 74 ML/MIN/{1.73_M2}
GLUCOSE SERPL-MCNC: 96 MG/DL (ref 70–99)
POTASSIUM SERPL-SCNC: 4.1 MMOL/L (ref 3.5–5.1)
SODIUM SERPL-SCNC: 135 MMOL/L (ref 136–145)

## 2024-11-16 PROCEDURE — 99233 SBSQ HOSP IP/OBS HIGH 50: CPT | Performed by: PSYCHIATRY & NEUROLOGY

## 2024-11-16 PROCEDURE — 80048 BASIC METABOLIC PNL TOTAL CA: CPT

## 2024-11-16 PROCEDURE — 6370000000 HC RX 637 (ALT 250 FOR IP): Performed by: INTERNAL MEDICINE

## 2024-11-16 PROCEDURE — 6370000000 HC RX 637 (ALT 250 FOR IP): Performed by: STUDENT IN AN ORGANIZED HEALTH CARE EDUCATION/TRAINING PROGRAM

## 2024-11-16 PROCEDURE — 2580000003 HC RX 258: Performed by: INTERNAL MEDICINE

## 2024-11-16 PROCEDURE — 6370000000 HC RX 637 (ALT 250 FOR IP): Performed by: PSYCHIATRY & NEUROLOGY

## 2024-11-16 RX ORDER — LEVETIRACETAM 500 MG/1
500 TABLET ORAL 2 TIMES DAILY
Qty: 60 TABLET | Refills: 3 | Status: SHIPPED | OUTPATIENT
Start: 2024-11-16

## 2024-11-16 RX ORDER — ATORVASTATIN CALCIUM 40 MG/1
40 TABLET, FILM COATED ORAL NIGHTLY
Status: DISCONTINUED | OUTPATIENT
Start: 2024-11-16 | End: 2024-11-16 | Stop reason: HOSPADM

## 2024-11-16 RX ORDER — ASPIRIN 81 MG/1
81 TABLET, CHEWABLE ORAL DAILY
Status: DISCONTINUED | OUTPATIENT
Start: 2024-11-16 | End: 2024-11-16 | Stop reason: HOSPADM

## 2024-11-16 RX ORDER — ASPIRIN 81 MG/1
81 TABLET, CHEWABLE ORAL DAILY
Qty: 30 TABLET | Refills: 3 | Status: SHIPPED | OUTPATIENT
Start: 2024-11-16

## 2024-11-16 RX ORDER — ATORVASTATIN CALCIUM 40 MG/1
40 TABLET, FILM COATED ORAL NIGHTLY
Qty: 30 TABLET | Refills: 0 | Status: SHIPPED | OUTPATIENT
Start: 2024-11-16

## 2024-11-16 RX ADMIN — APIXABAN 5 MG: 5 TABLET, FILM COATED ORAL at 10:21

## 2024-11-16 RX ADMIN — LEVETIRACETAM 500 MG: 500 TABLET, FILM COATED ORAL at 10:21

## 2024-11-16 RX ADMIN — SODIUM CHLORIDE, PRESERVATIVE FREE 10 ML: 5 INJECTION INTRAVENOUS at 10:21

## 2024-11-16 RX ADMIN — BUPROPION HYDROCHLORIDE 40 MG: 150 TABLET, FILM COATED, EXTENDED RELEASE ORAL at 10:24

## 2024-11-16 RX ADMIN — LOSARTAN POTASSIUM 100 MG: 50 TABLET, FILM COATED ORAL at 10:21

## 2024-11-16 RX ADMIN — TRIAMTERENE AND HYDROCHLOROTHIAZIDE 1 TABLET: 37.5; 25 TABLET ORAL at 10:22

## 2024-11-16 NOTE — CARE COORDINATION
Case Management Assessment            Discharge Note                    Date / Time of Note: 11/16/2024 2:18 PM                  Discharge Note Completed by: Arabella Lenin    Patient Name: Betty J Closser   YOB: 1945  Diagnosis: Syncope and collapse [R55]   Date / Time: 11/14/2024  8:29 PM    Current PCP: Lydia Byers, CAROL - CNP  Clinic patient: No    Hospitalization in the last 30 days: No       Advance Directives:  Code Status: Full Code  [place] DNR form completed and on chart: Not Indicated    Financial:  Payor: HU[employer] MEDICARE / Plan: HU[employer] CHOICE-PPO MEDICARE / Product Type: *No Product type* /      Pharmacy:    [id number] - [place], [place] - [address] - P [phone] - F [phone]  [address] [facility] [address]  Phone: [phone] Fax: [phone]    [facility] - [facility] [address] [phone] -  [phone]  [address] [facility] [address]  Phone: [phone] Fax: [phone]      Assistance purchasing medications?:    Assistance provided by Case Management: None at this time    Does patient want to participate in local refill/ meds to beds program?: Yes    Meds To Beds General Rules:  1. Can ONLY be done Monday- Friday between 8:30am-5pm  2. Prescription(s) must be in pharmacy by 3pm to be filled same day  3.Copy of patient's insurance/ prescription drug card and patient face sheet must be sent along with the prescription(s)  4. Cost of Rx cannot be added to hospital bill. If financial assistance is needed, please contact unit  or ;  or  CANNOT provide pharmacy voucher for patients co-pays  5. Patients can then  the prescription on their way out of the hospital at discharge, or pharmacy can deliver to the bedside if staff is available. (payment due at time of pick-up or delivery - cash, check, or card accepted)     Able to afford

## 2024-11-16 NOTE — DISCHARGE SUMMARY
V2.0  Discharge Summary    Name:  Betty J Closser /Age/Sex: 1945 (79 y.o. female)   Admit Date: 2024  Discharge Date: 24    MRN & CSN:  [id number] & [id number] Encounter Date and Time 24 12:42 PM EST    Attending:  Trav Scott DO Discharging Provider: Trav Scott DO       Hospital Course:     Brief HPI: Betty J Closser is a 79 y.o. female who presented with an episode of \"passing out\"     Betty J Closser is a 79 y.o. female with a past medical history of mild cognitive impairment, paroxysmal atrial fibrillation for which she is anticoagulated on Eliquis, hypertension, HOLLEY, HLD.  She was apparently evaluated earlier this year for an episode of amaurosis fugax.  She lives independently in an apartment, and is generally functional at baseline.  She presents to the emergency department by EMS after a possible syncopal episode, with confusion and amnesia.  EMS reports that the patient potentially fell, although cannot say when.  On interview with the patient, she describes being on the couch and then waking up on the floor not knowing how she got there.  Later in the interview, she does not recall saying this at all, and does not recall ever having been on the floor.  The patient is unable to give any further details of the events of this evening.  The patient's sister arrived shortly thereafter, and states that around dinnertime this evening, the patient called her and stated that she did not feel well and wanted her to come over and help, although had difficulty describing exactly what she needed help with.  At that time she did not describe any falls.  In the meantime, other family members were called, and at some point 911 was called by a family member.  When another family member arrived, EMS had also just arrived, and the patient was seated in a chair being evaluated.  EMS stated that she crawled to the door to unlock the door for them, although how she had ended up on the floor  patient receiving contrast for cardiac CTA, will defer carotid artery CTA for the outpatient setting.      The patient expressed appropriate understanding of, and agreement with the discharge recommendations, medications, and plan.     Consults this admission:  IP CONSULT TO CARDIOLOGY  IP CONSULT TO NEUROLOGY    Discharge Diagnosis:   Complex partial seizures (HCC)      Discharge Instruction:   Follow up appointments: Neurology, cardiology, primary care physician   Primary care physician: Lydia Byers APRN - CNP within 1 week  Diet: regular diet   Activity: activity as tolerated  Disposition: Discharged to:   [x]Home, [][facility], []SNF, []Acute Rehab, []Hospice   Condition on discharge: Stable  Labs and Tests to be Followed up as an outpatient by PCP or Specialist: CAM    Discharge Medications:        Medication List        ASK your doctor about these medications      amLODIPine 5 MG tablet  Commonly known as: NORVASC     atorvastatin 10 MG tablet  Commonly known as: LIPITOR     CALCIUM PO     cholestyramine light 4 g packet     Eliquis 5 MG Tabs tablet  Generic drug: apixaban     Imitrex 50 MG tablet  Generic drug: SUMAtriptan     losartan 100 MG tablet  Commonly known as: COZAAR     PARoxetine 20 MG tablet  Commonly known as: PAXIL     triamterene-hydroCHLOROthiazide 37.5-25 MG per tablet  Commonly known as: MAXZIDE-25             Objective Findings at Discharge:   /77   Pulse 60   Temp 97.4 °F (36.3 °C) (Oral)   Resp 16   Ht 1.702 m (5' 7\")   Wt 96.4 kg (212 lb 8.4 oz)   SpO2 98%   BMI 33.29 kg/m²       Physical Exam:     General: NAD  Eyes: EOMI  ENT: neck supple  Cardiovascular: Regular rate.  Respiratory: Clear to auscultation  Gastrointestinal: Soft, non tender  Genitourinary: no suprapubic tenderness  Musculoskeletal: No edema  Skin: warm, dry  Neuro: Oriented x2.  Psych: Mood appropriate.   Labs and Imaging   CTA CARDIAC W C STRC MORP W CONTRAST    Result Date: 11/15/2024  EXAM: CT

## 2024-11-16 NOTE — PROGRESS NOTES
Pt discharged to home.Discharge paperwork discussed, pt agreeable to discharge plan. Pt wheeled down to lobby to daughter's vehicle.

## 2024-11-16 NOTE — PLAN OF CARE
Problem: Safety - Adult  Goal: Free from fall injury  Outcome: Progressing  Note:  Remains free from falls, bed in low position, call light in reach, bed alarm monitoring for safety.     Problem: Pain  Goal: Verbalizes/displays adequate comfort level or baseline comfort level  Outcome: Progressing  Note:  Denies pain/needs, will continue to monitor.     Problem: Respiratory - Adult  Goal: Achieves optimal ventilation and oxygenation  Outcome: Progressing  Note:  O2 97 to 98% on Room Air.     Problem: Cardiovascular - Adult  Goal: Absence of cardiac dysrhythmias or at baseline  Outcome: Progressing  Note:  Sinus Bradycardia with IVCD & PQT, rate 51 at 2158.

## 2024-11-16 NOTE — PROGRESS NOTES
20mg)  Tight management of secondary prevention of stroke measures including daily antiplatelet agent; SBP < 130 mmHg AND DBP < 90 mmHg at ALL times; HbA1c <7%; LDL < 70 mg/dL and high intensity statin  Discussed with pt and daughter that she cannot engage in any activity where loss of consciousness would be dangerous to herself or others (e.g. driving, climbing, swimming alone, etc.) until approved by her neurologist. They both expressed understanding and agreement.   Additionally, no driving until cognitive assessment completed as well and is cleared by 's eval  Follow-up with neurology upon discharge for seizure management (including review of EEG done here, which will not ), amaurosis fugax ans stroke risk factors, and cognitive evaluation with likely Neuropsych testing  No further in-patient neurologic workup indicated; will sign off; call with questions.     SUBJECTIVE     Chart reviewed, events noted, pt seen & examined. No acute events overnight. Afebrile. Feels much better today. Feels that the \"fogginess\" she has had persistently for a long time is now gone since starting the Keppra.    Review of Systems  No changes since pt last seen other than those noted above.    PFSH  No change since the original history and note.     OBJECTIVE     Patient Vitals for the past 24 hrs:   BP Temp Temp src Pulse Resp SpO2 Weight   11/16/24 1233 124/77 -- -- 60 -- -- --   11/16/24 1019 119/68 97.4 °F (36.3 °C) Oral 51 16 98 % --   11/16/24 0615 (!) 149/89 97.5 °F (36.4 °C) Oral 54 16 96 % 96.4 kg (212 lb 8.4 oz)   11/15/24 2316 103/73 97.3 °F (36.3 °C) Oral (!) 49 18 97 % --   11/15/24 2158 -- -- -- 51 -- -- --   11/15/24 2008 (!) 148/64 97.4 °F (36.3 °C) Oral 52 18 98 % --   11/15/24 1651 119/60 98 °F (36.7 °C) Oral 51 16 97 % --   11/15/24 1409 136/62 -- -- 61 -- -- --   11/15/24 1401 (!) 178/83 -- -- 65 -- -- --   11/15/24 1350 133/69 -- -- 61 -- -- --   11/15/24 1346 (!) 171/72 -- -- 57 -- -- --  significant    stenosis.    Laboratory Review:   All results below, not included in previous notes, personally reviewed. Pertinent positives & negatives are addressed in Assessment & Plan section of note  Recent Results (from the past 36 hour(s))   Basic Metabolic Panel w/ Reflex to MG    Collection Time: 11/15/24  2:45 AM   Result Value Ref Range    Sodium 136 136 - 145 mmol/L    Potassium reflex Magnesium 3.8 3.5 - 5.1 mmol/L    Chloride 104 99 - 110 mmol/L    CO2 23 21 - 32 mmol/L    Anion Gap 9 3 - 16    Glucose 109 (H) 70 - 99 mg/dL    BUN 20 7 - 20 mg/dL    Creatinine 0.8 0.6 - 1.2 mg/dL    Est, Glom Filt Rate 74 >60    Calcium 8.2 (L) 8.3 - 10.6 mg/dL   Echo (TTE) limited (PRN contrast/bubble/strain/3D)    Collection Time: 11/15/24 12:18 PM   Result Value Ref Range    Body Surface Area 2.13 m2    LV EDV A2C 148 mL    LV EDV A4C 134 mL    LV ESV A2C 67 mL    LV ESV A4C 58 mL    IVSd 1.2 0.6 - 0.9 cm    LVIDd 4.8 3.9 - 5.3 cm    LVIDs 3.5 cm    LVPWd 1.4 0.6 - 0.9 cm    LV E' Lateral Velocity 10.20 cm/s    LV E' Septal Velocity 4.57 cm/s    LV Ejection Fraction A2C 55 %    LV Ejection Fraction A4C 57 %    EF BP 56 55 - 100 %    LA Minor Axis 5.4 cm    LA Major Axis 4.8 cm    LA Area 2C 20.1 cm2    LA Area 4C 15.1 cm2    LA Volume MOD A2C 61 22 - 52 mL    LA Volume MOD A4C 39 22 - 52 mL    LA Volume BP 51 22 - 52 mL    RV Free Wall Peak S' 14.3 cm/s    TAPSE 3.2 1.7 cm    Fractional Shortening 2D 27 28 - 44 %    LV ESV Index A4C 28 mL/m2    LV EDV Index A4C 65 mL/m2    LV ESV Index A2C 32 mL/m2    LV EDV Index A2C 71 mL/m2    LVIDd Index 2.32 cm/m2    LVIDs Index 1.69 cm/m2    LV RWT Ratio 0.58     LV Mass 2D 245.7 (A) 67 - 162 g    LV Mass 2D Index 118.7 43 - 95 g/m2    LA Volume Index BP 25 16 - 34 ml/m2    LA Volume Index MOD A2C 29 16 - 34 ml/m2    LA Volume Index MOD A4C 19 16 - 34 ml/m2   Basic Metabolic Panel w/ Reflex to MG    Collection Time: 11/16/24  6:37 AM   Result Value Ref Range    Sodium 135 (L)

## 2024-11-17 NOTE — PROCEDURES
PROCEDURE NOTE  Date: 2024   Name: Betty J Closser  YOB: 1945      Name: Betty J Closser   : 1945   Interpreting Physician: Paula Conley MD   Referring Physician: No ref. provider found   Date of EE2024      Clinical History:Altered mental status    Current Antiepileptic Medications: No current facility-administered medications for this encounter.         Technical Summary:  The EEG was recorded in a digital format on a patient who is reported to be awake and drowsy state during the recording. The patient was not sleep deprived prior to the EEG.    The recording revealed a background rhythm in the beta frequency range that was without posterior dominance.     The record was remarkable for the absence of epileptiform discharges.    Photic stimulation was performed at various flash frequencies and without photoparoxysmal response.    Hyperventilation was not performed due to medical condition.     During the recording stage II sleep  was not seen.     The EKG lead revealed no rhythm abnormalties.    EEG Interpretation:   The EEG was abnormal due to the presence of: beta activity.  No epileptiform activity.      Excessive beta activity is associated with the use of certain medications including benzodiazepines and barbiturates, and is otherwise of little diagnostic significance.    No focal, lateralizing, or epileptiform features were seen during the recording.    Clinical correlation is recommended.  The absence of epileptiform discharges on a single EEG does not rule out a diagnosis of  epilepsy or rule out non-convulsive or complex partial status epilepticus as a cause of altered mental status    Electronically signed by Paula Conley MD on 2024 at 4:47 PM

## 2024-11-18 ENCOUNTER — NURSE ONLY (OUTPATIENT)
Dept: CARDIOLOGY CLINIC | Age: 79
End: 2024-11-18

## 2024-11-18 ENCOUNTER — HOSPITAL ENCOUNTER (OUTPATIENT)
Dept: CT IMAGING | Age: 79
Discharge: HOME | End: 2024-11-18
Payer: MEDICARE

## 2024-11-18 DIAGNOSIS — Z03.89 UNDER OBSERVATION FOR SUSPECTED CORONARY ARTERY DISEASE: ICD-10-CM

## 2024-11-18 DIAGNOSIS — R93.89 ABNORMAL COMPUTED TOMOGRAPHY ANGIOGRAPHY (CTA): ICD-10-CM

## 2024-11-18 DIAGNOSIS — R55 SYNCOPE, UNSPECIFIED SYNCOPE TYPE: Primary | ICD-10-CM

## 2024-11-18 PROCEDURE — 75580 N-INVAS EST C FFR SW ALY CTA: CPT
